# Patient Record
Sex: FEMALE | Race: WHITE | Employment: OTHER | URBAN - NONMETROPOLITAN AREA
[De-identification: names, ages, dates, MRNs, and addresses within clinical notes are randomized per-mention and may not be internally consistent; named-entity substitution may affect disease eponyms.]

---

## 2017-04-26 RX ORDER — MAGNESIUM OXIDE/MAG AA CHELATE 300 MG
300 CAPSULE ORAL DAILY
COMMUNITY

## 2017-04-26 RX ORDER — ATENOLOL 50 MG/1
50 TABLET ORAL DAILY
COMMUNITY

## 2017-04-26 RX ORDER — LISINOPRIL 10 MG/1
10 TABLET ORAL 2 TIMES DAILY
COMMUNITY

## 2017-04-26 RX ORDER — LANOLIN ALCOHOL/MO/W.PET/CERES
1000 CREAM (GRAM) TOPICAL DAILY
COMMUNITY

## 2017-04-26 RX ORDER — AMLODIPINE BESYLATE 5 MG/1
5 TABLET ORAL DAILY
COMMUNITY

## 2017-05-03 ENCOUNTER — OFFICE VISIT (OUTPATIENT)
Dept: CARDIOLOGY | Age: 57
End: 2017-05-03
Payer: MEDICAID

## 2017-05-03 VITALS
HEART RATE: 62 BPM | WEIGHT: 158 LBS | DIASTOLIC BLOOD PRESSURE: 82 MMHG | HEIGHT: 68 IN | BODY MASS INDEX: 23.95 KG/M2 | SYSTOLIC BLOOD PRESSURE: 106 MMHG

## 2017-05-03 DIAGNOSIS — R06.02 SOB (SHORTNESS OF BREATH): ICD-10-CM

## 2017-05-03 DIAGNOSIS — R07.9 CHEST PAIN, UNSPECIFIED TYPE: Primary | ICD-10-CM

## 2017-05-03 DIAGNOSIS — I10 ESSENTIAL HYPERTENSION: ICD-10-CM

## 2017-05-03 DIAGNOSIS — R07.89 CHEST WALL PAIN: ICD-10-CM

## 2017-05-03 PROCEDURE — 93000 ELECTROCARDIOGRAM COMPLETE: CPT | Performed by: NURSE PRACTITIONER

## 2017-05-03 PROCEDURE — 99203 OFFICE O/P NEW LOW 30 MIN: CPT | Performed by: NURSE PRACTITIONER

## 2017-05-03 RX ORDER — MELOXICAM 15 MG/1
15 TABLET ORAL DAILY PRN
Qty: 30 TABLET | Refills: 0 | Status: SHIPPED | OUTPATIENT
Start: 2017-05-03 | End: 2017-06-07 | Stop reason: ALTCHOICE

## 2017-05-04 PROBLEM — I10 ESSENTIAL HYPERTENSION: Status: ACTIVE | Noted: 2017-05-04

## 2017-05-04 PROBLEM — R07.89 CHEST DISCOMFORT: Status: ACTIVE | Noted: 2017-05-04

## 2017-05-04 PROBLEM — R07.89 CHEST WALL PAIN: Status: ACTIVE | Noted: 2017-05-04

## 2017-05-04 PROBLEM — R07.9 CHEST PAIN: Status: ACTIVE | Noted: 2017-05-04

## 2017-05-04 PROBLEM — R06.02 SOB (SHORTNESS OF BREATH): Status: ACTIVE | Noted: 2017-05-04

## 2017-05-10 ENCOUNTER — HOSPITAL ENCOUNTER (OUTPATIENT)
Dept: NON INVASIVE DIAGNOSTICS | Age: 57
Discharge: HOME | End: 2017-05-10
Payer: MEDICAID

## 2017-05-10 DIAGNOSIS — I10 ESSENTIAL HYPERTENSION: ICD-10-CM

## 2017-05-10 LAB
LV EF: 50 %
LVEF MODALITY: NORMAL

## 2017-05-10 PROCEDURE — 93306 TTE W/DOPPLER COMPLETE: CPT

## 2017-05-10 PROCEDURE — 93350 STRESS TTE ONLY: CPT

## 2017-06-07 ENCOUNTER — OFFICE VISIT (OUTPATIENT)
Dept: CARDIOLOGY | Age: 57
End: 2017-06-07
Payer: MEDICAID

## 2017-06-07 VITALS
HEART RATE: 64 BPM | BODY MASS INDEX: 23.79 KG/M2 | DIASTOLIC BLOOD PRESSURE: 82 MMHG | SYSTOLIC BLOOD PRESSURE: 138 MMHG | WEIGHT: 157 LBS | HEIGHT: 68 IN

## 2017-06-07 DIAGNOSIS — I10 ESSENTIAL HYPERTENSION: ICD-10-CM

## 2017-06-07 DIAGNOSIS — R07.9 CHEST PAIN, UNSPECIFIED TYPE: Primary | ICD-10-CM

## 2017-06-07 PROCEDURE — 99213 OFFICE O/P EST LOW 20 MIN: CPT | Performed by: NURSE PRACTITIONER

## 2017-09-07 ENCOUNTER — HOSPITAL ENCOUNTER (OUTPATIENT)
Dept: GENERAL RADIOLOGY | Age: 57
Discharge: HOME | End: 2017-09-07
Payer: MEDICAID

## 2017-09-07 ENCOUNTER — OFFICE VISIT (OUTPATIENT)
Dept: CARDIOLOGY | Age: 57
End: 2017-09-07
Payer: MEDICAID

## 2017-09-07 VITALS
DIASTOLIC BLOOD PRESSURE: 80 MMHG | WEIGHT: 156 LBS | BODY MASS INDEX: 23.64 KG/M2 | HEIGHT: 68 IN | HEART RATE: 60 BPM | SYSTOLIC BLOOD PRESSURE: 120 MMHG

## 2017-09-07 DIAGNOSIS — I10 ESSENTIAL HYPERTENSION: ICD-10-CM

## 2017-09-07 DIAGNOSIS — R07.89 OTHER CHEST PAIN: ICD-10-CM

## 2017-09-07 DIAGNOSIS — F17.200 SMOKER: ICD-10-CM

## 2017-09-07 DIAGNOSIS — Z82.49 FAMILY HISTORY OF CORONARY ARTERY DISEASE: ICD-10-CM

## 2017-09-07 DIAGNOSIS — R06.02 SOB (SHORTNESS OF BREATH): ICD-10-CM

## 2017-09-07 DIAGNOSIS — R07.89 OTHER CHEST PAIN: Primary | ICD-10-CM

## 2017-09-07 LAB
ANION GAP SERPL CALCULATED.3IONS-SCNC: 12 MMOL/L (ref 7–19)
BASOPHILS ABSOLUTE: 0.1 K/UL (ref 0–0.2)
BASOPHILS RELATIVE PERCENT: 0.7 % (ref 0–1)
BUN BLDV-MCNC: 8 MG/DL (ref 6–20)
CALCIUM SERPL-MCNC: 10.2 MG/DL (ref 8.6–10)
CHLORIDE BLD-SCNC: 101 MMOL/L (ref 98–111)
CO2: 31 MMOL/L (ref 22–29)
CREAT SERPL-MCNC: 0.5 MG/DL (ref 0.5–0.9)
EOSINOPHILS ABSOLUTE: 0.1 K/UL (ref 0–0.6)
EOSINOPHILS RELATIVE PERCENT: 1.6 % (ref 0–5)
GFR NON-AFRICAN AMERICAN: >60
GLUCOSE BLD-MCNC: 78 MG/DL (ref 74–109)
HCT VFR BLD CALC: 46.1 % (ref 37–47)
HEMOGLOBIN: 15.4 G/DL (ref 12–16)
LYMPHOCYTES ABSOLUTE: 2.6 K/UL (ref 1.1–4.5)
LYMPHOCYTES RELATIVE PERCENT: 38.8 % (ref 20–40)
MCH RBC QN AUTO: 30.4 PG (ref 27–31)
MCHC RBC AUTO-ENTMCNC: 33.4 G/DL (ref 33–37)
MCV RBC AUTO: 91.1 FL (ref 81–99)
MONOCYTES ABSOLUTE: 0.5 K/UL (ref 0–0.9)
MONOCYTES RELATIVE PERCENT: 6.8 % (ref 0–10)
NEUTROPHILS ABSOLUTE: 3.5 K/UL (ref 1.5–7.5)
NEUTROPHILS RELATIVE PERCENT: 51.8 % (ref 50–65)
PDW BLD-RTO: 13 % (ref 11.5–14.5)
PLATELET # BLD: 220 K/UL (ref 130–400)
PMV BLD AUTO: 9.5 FL (ref 9.4–12.3)
POTASSIUM SERPL-SCNC: 4.3 MMOL/L (ref 3.5–5)
RBC # BLD: 5.06 M/UL (ref 4.2–5.4)
SODIUM BLD-SCNC: 144 MMOL/L (ref 136–145)
WBC # BLD: 6.8 K/UL (ref 4.8–10.8)

## 2017-09-07 PROCEDURE — 99214 OFFICE O/P EST MOD 30 MIN: CPT | Performed by: NURSE PRACTITIONER

## 2017-09-07 PROCEDURE — 71020 XR CHEST STANDARD TWO VW: CPT

## 2017-09-13 ENCOUNTER — HOSPITAL ENCOUNTER (OUTPATIENT)
Dept: CARDIAC CATH/INVASIVE PROCEDURES | Age: 57
Discharge: HOME | End: 2017-09-13
Attending: INTERNAL MEDICINE | Admitting: INTERNAL MEDICINE
Payer: MEDICAID

## 2017-09-13 VITALS
RESPIRATION RATE: 14 BRPM | HEIGHT: 68 IN | WEIGHT: 156 LBS | SYSTOLIC BLOOD PRESSURE: 153 MMHG | BODY MASS INDEX: 23.64 KG/M2 | OXYGEN SATURATION: 96 % | DIASTOLIC BLOOD PRESSURE: 71 MMHG | TEMPERATURE: 97.9 F | HEART RATE: 60 BPM

## 2017-09-13 LAB
EKG P AXIS: 67 DEGREES
EKG P-R INTERVAL: 192 MS
EKG Q-T INTERVAL: 460 MS
EKG QRS DURATION: 96 MS
EKG QTC CALCULATION (BAZETT): 453 MS
EKG T AXIS: 63 DEGREES

## 2017-09-13 PROCEDURE — 2500000003 HC RX 250 WO HCPCS

## 2017-09-13 PROCEDURE — C1769 GUIDE WIRE: HCPCS

## 2017-09-13 PROCEDURE — 2709999900 HC NON-CHARGEABLE SUPPLY

## 2017-09-13 PROCEDURE — 93458 L HRT ARTERY/VENTRICLE ANGIO: CPT | Performed by: INTERNAL MEDICINE

## 2017-09-13 PROCEDURE — 93005 ELECTROCARDIOGRAM TRACING: CPT

## 2017-09-13 PROCEDURE — 2720000001 HC MISC SURG SUPPLY STERILE $51-500

## 2017-09-13 PROCEDURE — 2580000003 HC RX 258: Performed by: INTERNAL MEDICINE

## 2017-09-13 PROCEDURE — 99024 POSTOP FOLLOW-UP VISIT: CPT | Performed by: INTERNAL MEDICINE

## 2017-09-13 PROCEDURE — C1894 INTRO/SHEATH, NON-LASER: HCPCS

## 2017-09-13 PROCEDURE — 6360000002 HC RX W HCPCS

## 2017-09-13 PROCEDURE — C1887 CATHETER, GUIDING: HCPCS

## 2017-09-13 RX ORDER — SODIUM CHLORIDE 0.9 % (FLUSH) 0.9 %
10 SYRINGE (ML) INJECTION EVERY 12 HOURS SCHEDULED
Status: DISCONTINUED | OUTPATIENT
Start: 2017-09-13 | End: 2017-09-13 | Stop reason: HOSPADM

## 2017-09-13 RX ORDER — SODIUM CHLORIDE 0.9 % (FLUSH) 0.9 %
10 SYRINGE (ML) INJECTION PRN
Status: DISCONTINUED | OUTPATIENT
Start: 2017-09-13 | End: 2017-09-13 | Stop reason: HOSPADM

## 2017-09-13 RX ORDER — SODIUM CHLORIDE 9 MG/ML
INJECTION, SOLUTION INTRAVENOUS CONTINUOUS
Status: ACTIVE | OUTPATIENT
Start: 2017-09-13 | End: 2017-09-13

## 2017-09-13 RX ORDER — SODIUM CHLORIDE 9 MG/ML
INJECTION, SOLUTION INTRAVENOUS CONTINUOUS
Status: DISCONTINUED | OUTPATIENT
Start: 2017-09-13 | End: 2017-09-13 | Stop reason: HOSPADM

## 2017-09-13 RX ADMIN — SODIUM CHLORIDE: 9 INJECTION, SOLUTION INTRAVENOUS at 09:43

## 2018-10-18 RX ORDER — LISINOPRIL 10 MG/1
10 TABLET ORAL DAILY
COMMUNITY

## 2018-10-18 RX ORDER — LANOLIN ALCOHOL/MO/W.PET/CERES
1000 CREAM (GRAM) TOPICAL DAILY
COMMUNITY

## 2018-10-18 RX ORDER — AMLODIPINE BESYLATE 5 MG/1
5 TABLET ORAL DAILY
COMMUNITY

## 2018-10-18 RX ORDER — MAGNESIUM OXIDE/MAG AA CHELATE 300 MG
CAPSULE ORAL
COMMUNITY

## 2018-10-18 RX ORDER — ALBUTEROL SULFATE 90 UG/1
2 AEROSOL, METERED RESPIRATORY (INHALATION) EVERY 4 HOURS PRN
COMMUNITY

## 2018-10-18 RX ORDER — ATENOLOL 50 MG/1
50 TABLET ORAL DAILY
COMMUNITY

## 2018-10-29 ENCOUNTER — OFFICE VISIT (OUTPATIENT)
Dept: PULMONOLOGY | Facility: CLINIC | Age: 58
End: 2018-10-29

## 2018-10-29 VITALS
HEIGHT: 68 IN | BODY MASS INDEX: 23.95 KG/M2 | SYSTOLIC BLOOD PRESSURE: 122 MMHG | RESPIRATION RATE: 16 BRPM | DIASTOLIC BLOOD PRESSURE: 74 MMHG | WEIGHT: 158 LBS | HEART RATE: 64 BPM

## 2018-10-29 DIAGNOSIS — F17.210 CIGARETTE NICOTINE DEPENDENCE WITHOUT COMPLICATION: ICD-10-CM

## 2018-10-29 DIAGNOSIS — J98.4 RESTRICTIVE LUNG DISEASE: Primary | ICD-10-CM

## 2018-10-29 DIAGNOSIS — J98.4 DISEASE OF LUNG: Primary | ICD-10-CM

## 2018-10-29 LAB
DIFF CAP.CO: NORMAL ML/MMHG SEC
FEV1/FVC: NORMAL %
FEV1: NORMAL LITERS
FVC VOL RESPIRATORY: NORMAL LITERS
TLC: NORMAL LITERS

## 2018-10-29 PROCEDURE — 94010 BREATHING CAPACITY TEST: CPT | Performed by: INTERNAL MEDICINE

## 2018-10-29 PROCEDURE — 90471 IMMUNIZATION ADMIN: CPT | Performed by: INTERNAL MEDICINE

## 2018-10-29 PROCEDURE — 94727 GAS DIL/WSHOT DETER LNG VOL: CPT | Performed by: INTERNAL MEDICINE

## 2018-10-29 PROCEDURE — 94729 DIFFUSING CAPACITY: CPT | Performed by: INTERNAL MEDICINE

## 2018-10-29 PROCEDURE — 90674 CCIIV4 VAC NO PRSV 0.5 ML IM: CPT | Performed by: INTERNAL MEDICINE

## 2018-10-29 PROCEDURE — 99213 OFFICE O/P EST LOW 20 MIN: CPT | Performed by: INTERNAL MEDICINE

## 2018-10-29 RX ORDER — MELATONIN
2000 DAILY
COMMUNITY
End: 2020-02-05 | Stop reason: DRUGHIGH

## 2018-10-29 NOTE — PROGRESS NOTES
Subjective   Janneth Baltazar is a 58 y.o. female.   CC:   Chief Complaint   Patient presents with   • Lung Follow-up     Lung scarring        HPI: Lung scarring, restrictive lung disease, and chronic tobacco use.  History of Present Illness   The patient has no acute respiratory tract complaints.  She continued to smoke but has well under a 30-pack-year history of smoking.  I did  her regarding smoking cessation.  She also received a flu shot today.  The following portions of the patient's history were reviewed and updated as appropriate: allergies, current medications, past family history, past medical history, past social history, past surgical history and problem list.   has a past medical history of Breast cancer (CMS/[facility]).  family history includes Cancer in her mother.   reports that she has been smoking.  She has a 10.00 pack-year smoking history. She has never used smokeless tobacco. She reports that she does not drink alcohol or use drugs.  Review of Systems   Constitutional: Negative for activity change, chills and fever.   HENT: Negative for congestion.    Eyes: Negative for visual disturbance.   Respiratory: Positive for shortness of breath.    Cardiovascular: Negative for chest pain.   Gastrointestinal: Negative for nausea and vomiting.   Genitourinary: Negative for difficulty urinating.   Musculoskeletal:        She has some chronic chest wall pain.   Skin:        She complains of some patchy hair loss after receiving a tetanus booster a few weeks ago.   Neurological: Negative for dizziness.   Psychiatric/Behavioral: The patient is not nervous/anxious.        Objective   Complete pulmonary functions are performed and she has evidence of a moderate restrictive ventilatory defect with small airways disease on spirometry.  Lung volumes confirm a moderate restrictive ventilatory defect with a mild decrease in inspiratory capacity.  Diffusion capacity is within normal limits particularly when  corrected for alveolar volume.  Physical Exam  General: She is a pleasant white female who presented no acute distress.  HEENT: She has a few areas of thinning hair in the posterior scalp.  Eyes: Pupils are equal round reactive to light.  Neck: Supple with no JVD.  Chest: Sounds are decreased at the right base.  Cardiac: No murmur or gallop noted.  Abdomen: Soft.  Extremities: There is no cyanosis clubbing or edema.  Dermatologic: No rash noted.  Musculoskeletal: No joint deformities noted.  Neurologic: She has no focal deficits.  Psychiatric: She has appropriate mood and affect.  Lymphatic: No adenopathy palpated  Assessment/Plan   Janneth was seen today for lung follow-up.    Diagnoses and all orders for this visit:    Restrictive lung disease    Cigarette nicotine dependence without complication    Other orders  -     Flucelvax Quad=>4Years ([id number])    She again is counseled regarding smoking cessation and she will return for follow-up appointment in 6 months.    Patient's Body mass index is 24.02 kg/m². BMI is within normal parameters. No follow-up required.

## 2019-06-03 ENCOUNTER — OFFICE VISIT (OUTPATIENT)
Dept: PULMONOLOGY | Facility: CLINIC | Age: 59
End: 2019-06-03

## 2019-06-03 VITALS
DIASTOLIC BLOOD PRESSURE: 80 MMHG | WEIGHT: 152 LBS | HEART RATE: 75 BPM | HEIGHT: 68 IN | SYSTOLIC BLOOD PRESSURE: 120 MMHG | OXYGEN SATURATION: 98 % | BODY MASS INDEX: 23.04 KG/M2

## 2019-06-03 DIAGNOSIS — R05.9 COUGH: Primary | ICD-10-CM

## 2019-06-03 DIAGNOSIS — J98.4 RESTRICTIVE LUNG DISEASE: ICD-10-CM

## 2019-06-03 DIAGNOSIS — F17.210 CIGARETTE NICOTINE DEPENDENCE WITHOUT COMPLICATION: ICD-10-CM

## 2019-06-03 DIAGNOSIS — R06.02 SHORTNESS OF BREATH: ICD-10-CM

## 2019-06-03 DIAGNOSIS — C50.919 MALIGNANT NEOPLASM OF FEMALE BREAST, UNSPECIFIED ESTROGEN RECEPTOR STATUS, UNSPECIFIED LATERALITY, UNSPECIFIED SITE OF BREAST (HCC): ICD-10-CM

## 2019-06-03 PROCEDURE — 99214 OFFICE O/P EST MOD 30 MIN: CPT | Performed by: INTERNAL MEDICINE

## 2019-06-03 RX ORDER — FLUTICASONE PROPIONATE 50 MCG
2 SPRAY, SUSPENSION (ML) NASAL DAILY
COMMUNITY

## 2019-06-03 NOTE — PROGRESS NOTES
Subjective   Janneth Baltazar is a 58 y.o. female.     Chief Complaint   Patient presents with   • f/u lung disease      No My Sticky Note on file.    History of Present Illness   The patient returns today for follow-up.  She does have problems at times when she bends over with dyspnea and then some discomfort in the chest wall.  I think this relates to the fact that when she is bent over she cannot utilize her diaphragm to taking a deep breath and she has had chest wall surgery with some reconstructive procedure because of her breast cancer and I think some of these issues related to muscle pain and difficulty with the use of the accessory muscles of respiration in the right chest wall.  I did tell her that the main thing she can do to help this from getting worse or causing more difficulties is to protect her lung function by quitting smoking.  When I asked her in more detail about her about her smoking history she actually states she has smoked at least a pack a day for 30 years so she is a candidate for lung cancer screening we will arrange for same.    Medical/Family/Social History   has a past medical history of Breast cancer (CMS/[facility]).   has a past surgical history that includes Breast surgery.  family history includes Cancer in her mother.   reports that she has been smoking.  She has a 30.00 pack-year smoking history. She has never used smokeless tobacco. She reports that she does not drink alcohol or use drugs.  Allergies   Allergen Reactions   • Adhesive Tape Unknown (See Comments)   • Levaquin [Levofloxacin] Unknown (See Comments)     Unknown       Medications    Current Outpatient Medications:   •  albuterol (PROVENTIL HFA;VENTOLIN HFA) 108 (90 Base) MCG/ACT inhaler, Inhale 2 puffs Every 4 (Four) Hours As Needed for Wheezing., Disp: , Rfl:   •  amLODIPine (NORVASC) 5 MG tablet, Take 5 mg by mouth Daily., Disp: , Rfl:   •  atenolol (TENORMIN) 50 MG tablet, Take 50 mg by mouth Daily., Disp: , Rfl:   •   "Calcium Carb-Cholecalciferol (CALCIUM 600+D3) 600-200 MG-UNIT tablet, Take  by mouth., Disp: , Rfl:   •  cholecalciferol (VITAMIN D3) 1000 units tablet, Take 2,000 Units by mouth Daily., Disp: , Rfl:   •  fluticasone (FLONASE) 50 MCG/ACT nasal spray, 2 sprays into the nostril(s) as directed by provider Daily., Disp: , Rfl:   •  lisinopril (PRINIVIL,ZESTRIL) 10 MG tablet, Take 10 mg by mouth Daily., Disp: , Rfl:   •  Magnesium 300 MG capsule, Take  by mouth., Disp: , Rfl:   •  vitamin B-12 (CYANOCOBALAMIN) 1000 MCG tablet, Take 1,000 mcg by mouth Daily., Disp: , Rfl:     Review of Systems   Constitutional: Negative for chills and fever.   HENT: Negative for congestion.    Eyes: Negative for visual disturbance.   Respiratory: Positive for cough and shortness of breath.    Cardiovascular: Negative for leg swelling.   Gastrointestinal: Negative for diarrhea, nausea and vomiting.   Genitourinary: Negative for difficulty urinating.   Musculoskeletal: Negative for arthralgias.        She has had chest wall pain on the right particularly if she bends over or lays on her right side.   Skin: Negative for rash.   Neurological: Negative for dizziness and speech difficulty.   Hematological: Negative for adenopathy.   Psychiatric/Behavioral: The patient is not nervous/anxious.      ------------------------------------  Objective   /80   Pulse 75   Ht 172.7 cm (68\")   Wt 68.9 kg (152 lb)   SpO2 98% Comment: ra  Breastfeeding? No   BMI 23.11 kg/m²   Physical Exam   Constitutional: She is oriented to person, place, and time. She appears well-developed and well-nourished.   HENT:   Head: Normocephalic and atraumatic.   Eyes: EOM are normal. Pupils are equal, round, and reactive to light.   Neck: Normal range of motion. Neck supple.   Cardiovascular: Normal rate, regular rhythm and normal heart sounds.   Pulmonary/Chest: Effort normal.   Breath sounds are somewhat diminished at the right base.   Abdominal: Soft. "   Musculoskeletal: Normal range of motion.   Neurological: She is alert and oriented to person, place, and time.   Skin: Skin is warm and dry.   Psychiatric: She has a normal mood and affect.   Nursing note and vitals reviewed.          Pulmonary Functions Testing Results:  FEV1   Date Value Ref Range Status   10/29/2018 53% liters Final     FVC   Date Value Ref Range Status   10/29/2018 58% liters Final     FEV1/FVC   Date Value Ref Range Status   10/29/2018 72.86% % Final     TLC   Date Value Ref Range Status   10/29/2018 78% liters Final     DLCO   Date Value Ref Range Status   10/29/2018 84% ml/mmHg sec Final        Assessment/Plan   Janneth was seen today for f/u lung disease.    Diagnoses and all orders for this visit:    Cough    Shortness of breath    Restrictive lung disease    Cigarette nicotine dependence without complication  -     CT Chest Low Dose Wo; Future    Malignant neoplasm of female breast, unspecified estrogen receptor status, unspecified laterality, unspecified site of breast (CMS/HCC)      Patient's Body mass index is 23.11 kg/m². BMI is within normal parameters. No follow-up required..      I do think this patient's history of breast cancer and reconstructive surgery for same is a significant contributing factor to her dyspnea and that is because at amounts of the right chest.  I am going to get the screening chest CT we will try to do this at [facility] and call her with results or if that is not feasible we will try doing at [facility].  Assuming no significant normalities are present we will see her back in 6 months with complete pulmonary functions on return.  We can certainly continue to do yearly screening chest CTs as long she meets screening guidelines.

## 2019-06-03 NOTE — PATIENT INSTRUCTIONS
I did advise the patient that she would be a candidate for lung cancer screening as on questioning her regarding her smoking history she states she has smoked a pack a day for at least 30 years.  We will try to schedule this at [facility] and call with results.  I will see her back in 6 months otherwise with complete pulmonary function studies.

## 2019-06-19 DIAGNOSIS — F17.210 CIGARETTE NICOTINE DEPENDENCE WITHOUT COMPLICATION: ICD-10-CM

## 2019-12-03 ENCOUNTER — OFFICE VISIT (OUTPATIENT)
Dept: PULMONOLOGY | Facility: CLINIC | Age: 59
End: 2019-12-03

## 2019-12-03 VITALS
HEART RATE: 62 BPM | DIASTOLIC BLOOD PRESSURE: 68 MMHG | BODY MASS INDEX: 24.59 KG/M2 | WEIGHT: 153 LBS | OXYGEN SATURATION: 97 % | SYSTOLIC BLOOD PRESSURE: 120 MMHG | HEIGHT: 66 IN

## 2019-12-03 DIAGNOSIS — R05.9 COUGH: Primary | ICD-10-CM

## 2019-12-03 DIAGNOSIS — R05.9 COUGH: ICD-10-CM

## 2019-12-03 DIAGNOSIS — F17.210 CIGARETTE NICOTINE DEPENDENCE WITHOUT COMPLICATION: ICD-10-CM

## 2019-12-03 DIAGNOSIS — J44.9 COPD, MODERATE (HCC): ICD-10-CM

## 2019-12-03 DIAGNOSIS — J98.4 RESTRICTIVE LUNG DISEASE: Primary | ICD-10-CM

## 2019-12-03 PROCEDURE — 99214 OFFICE O/P EST MOD 30 MIN: CPT | Performed by: INTERNAL MEDICINE

## 2019-12-03 PROCEDURE — 94664 DEMO&/EVAL PT USE INHALER: CPT | Performed by: INTERNAL MEDICINE

## 2019-12-03 PROCEDURE — 94727 GAS DIL/WSHOT DETER LNG VOL: CPT | Performed by: INTERNAL MEDICINE

## 2019-12-03 PROCEDURE — 94010 BREATHING CAPACITY TEST: CPT | Performed by: INTERNAL MEDICINE

## 2019-12-03 PROCEDURE — 94729 DIFFUSING CAPACITY: CPT | Performed by: INTERNAL MEDICINE

## 2019-12-03 RX ORDER — DOCUSATE SODIUM 100 MG/1
100 CAPSULE, LIQUID FILLED ORAL 2 TIMES DAILY
COMMUNITY

## 2019-12-03 NOTE — PROGRESS NOTES
Subjective   Janneth Baltazar is a 59 y.o. female.     Chief Complaint   Patient presents with   • Cough      No My Sticky Note on file.    History of Present Illness   The patient had problems a few months ago with severe bronchitis and flulike symptoms and was on antibiotics periodically for a period of about 2 months.  She also took some of a young relatives neb treatments which helped her.  She already is on albuterol to her that was the same medication most likely.  Her pulmonary function study today show improvement in both her FVC and FEV1 but to a greater degree in her FVC so she now has a moderate obstructive pattern.  This is not unexpected based on her smoking history.  She has some restrictive lung disease due to scarring from previous surgical procedures but now does have an obstructive pattern.  I did tell we will add Spiriva to her regimen and I gave her samples of the Respimat device and demonstrated the use of the Respimat device to her and we will also check an alpha-1 antitrypsin study and call with results.  She did have problems recently with severe chest and back tightness and had evaluation at a local emergency room was felt to be having muscle spasms.  She clearly has tenderness to palpation over the upper back between the scapula areas.  She was prescribed a muscle relaxer for this but has not yet started it because she is concerned about side effects and she will follow-up with her primary care physician on this.    Medical/Family/Social History   has a past medical history of Breast cancer (CMS/[facility]).   has a past surgical history that includes Breast surgery.  family history includes Cancer in her mother.   reports that she has been smoking.  She has a 30.00 pack-year smoking history. She has never used smokeless tobacco. She reports that she does not drink alcohol or use drugs.  Allergies   Allergen Reactions   • Adhesive Tape Unknown (See Comments)   • Levaquin [Levofloxacin] Unknown (See  "Comments)     Unknown       Medications    Current Outpatient Medications:   •  albuterol (PROVENTIL HFA;VENTOLIN HFA) 108 (90 Base) MCG/ACT inhaler, Inhale 2 puffs Every 4 (Four) Hours As Needed for Wheezing., Disp: , Rfl:   •  amLODIPine (NORVASC) 5 MG tablet, Take 5 mg by mouth Daily., Disp: , Rfl:   •  atenolol (TENORMIN) 50 MG tablet, Take 50 mg by mouth Daily., Disp: , Rfl:   •  Calcium Carb-Cholecalciferol (CALCIUM 600+D3) 600-200 MG-UNIT tablet, Take  by mouth., Disp: , Rfl:   •  cholecalciferol (VITAMIN D3) 1000 units tablet, Take 2,000 Units by mouth Daily., Disp: , Rfl:   •  docusate sodium (COLACE) 100 MG capsule, Take 100 mg by mouth 2 (Two) Times a Day., Disp: , Rfl:   •  fluticasone (FLONASE) 50 MCG/ACT nasal spray, 2 sprays into the nostril(s) as directed by provider Daily., Disp: , Rfl:   •  lisinopril (PRINIVIL,ZESTRIL) 10 MG tablet, Take 10 mg by mouth Daily., Disp: , Rfl:   •  Magnesium 300 MG capsule, Take  by mouth., Disp: , Rfl:   •  vitamin B-12 (CYANOCOBALAMIN) 1000 MCG tablet, Take 1,000 mcg by mouth Daily., Disp: , Rfl:   •  tiotropium bromide monohydrate (SPIRIVA RESPIMAT) 2.5 MCG/ACT aerosol solution inhaler, Inhale 2 puffs Daily., Disp: 4 inhaler, Rfl: 0    Review of Systems   Constitutional: Negative for chills and fever.   HENT: Negative for congestion.    Eyes: Negative for visual disturbance.   Respiratory: Positive for cough, chest tightness and shortness of breath.    Cardiovascular: Positive for chest pain.   Gastrointestinal: Negative for diarrhea, nausea and vomiting.   Genitourinary: Negative for difficulty urinating.   Musculoskeletal: Negative for arthralgias.   Skin: Negative for rash.   Neurological: Negative for dizziness and speech difficulty.   Psychiatric/Behavioral: The patient is not nervous/anxious.      ------------------------------------  Objective   /68   Pulse 62   Ht 167.6 cm (66\")   Wt 69.4 kg (153 lb)   SpO2 97% Comment: RA  Breastfeeding? No   " BMI 24.69 kg/m²   Physical Exam   Constitutional: She is oriented to person, place, and time. She appears well-developed and well-nourished.   HENT:   Head: Normocephalic and atraumatic.   Eyes: EOM are normal. Pupils are equal, round, and reactive to light.   Neck: Normal range of motion. Neck supple.   Cardiovascular: Normal rate, regular rhythm and normal heart sounds.   Pulmonary/Chest: Effort normal.   Breath sounds are diminished at the right base.   Abdominal: Soft.   Musculoskeletal: Normal range of motion. She exhibits tenderness.   She has tenderness over the upper back between the scapula areas.   Neurological: She is alert and oriented to person, place, and time.   Skin: Skin is warm and dry.   Psychiatric: She has a normal mood and affect.   Nursing note and vitals reviewed.            Pulmonary Functions Testing Results:  PFT Values        Some values may be hidden. Unless noted otherwise, only the newest values recorded on each date are displayed.         Old Values PFT Results 10/29/18 12/3/19   FVC 58%    FEV1 53%    FEV1/FVC 72.86%    DLCO 84%    TLC 78%       Pre Drug PFT Results 10/29/18 12/3/19   FVC  71   FEV1  60   FEF 25-75%  33   FEV1/FVC  67.41      Post Drug PFT Results 10/29/18 12/3/19   No data to display.      Other Tests PFT Results 10/29/18 12/3/19   TLC  82   RV  109   DLCO  81   D/VAsb  101               My interpretation of PFT:  1.  Spirometry is consistent with a moderate obstructive ventilatory defect.  2.  Lung volumes really decrease in vital capacity.  There is also a decrease in expiratory reserve volume and a low normal inspiratory capacity.  3.  Diffusion capacity is within normal limits particularly when corrected for alveolar volume.  4.  The patient spirometry shows improvement in both her FVC and FEV1 but to a greater degree in her FVC which now results in an FEV1 to FVC ratio of under 70% consistent with airflow obstruction.  Her total lung capacity is dropped  slightly but not significantly.  Diffusion capacity is also dropped slightly but not significantly.  Assessment/Plan   Janneth was seen today for cough.    Diagnoses and all orders for this visit:    Restrictive lung disease    COPD, moderate (CMS/HCC)  -     tiotropium bromide monohydrate (SPIRIVA RESPIMAT) 2.5 MCG/ACT aerosol solution inhaler; Inhale 2 puffs Daily.    Cough  -     Pulmonary Function Test    Cigarette nicotine dependence without complication      Patient's Body mass index is 24.69 kg/m². BMI is within normal parameters. No follow-up required..      Again, at her previous baseline she had evidence of a mild restrictive ventilatory defect and her total lung capacity is now low normal although actually has dropped numerically from her last visit.  I still think she has some degree of restriction related to her previous surgical procedures but she now has some airflow obstruction as well not unexpected with her smoking history.  This will be treated with Spiriva in addition to her albuterol and again she is given samples of the Spiriva Respimat device and demonstrated the use of the Respimat device to her and we will check an alpha-1 antitrypsin study and call with results.  Otherwise she will return in about 2 months to see how she is doing with the Spiriva.  She is again counseled regarding smoking cessation.

## 2019-12-03 NOTE — PROCEDURES
Pulmonary Function Test  Performed by: Philip Hernandez MD  Authorized by: Philip Hernandez MD      Pre Drug    FVC: 71%   FEV1: 60%   FEF 25-75%: 33%   FEV1/FVC: 67.41%   T%   RV: 109%   DLCO: 81%   D/VAsb: 101%

## 2019-12-03 NOTE — PATIENT INSTRUCTIONS
The patient had an episode of severe bronchitis and flulike symptoms a few months ago and did use a young relatives nebulizer with some good results.  She is already on albuterol inhaler but is not on maintenance therapy and I am going to add the Spiriva Respimat to her regimen to 2.5 mcg size 2 puffs daily.  I did demonstrate the use of the Respimat device to her.  Her PFTs show improvement overall but the improvement is more in the FVC so now she does have an obstructive pattern and she is encouraged on smoking cessation.  She would be a candidate for a follow-up screening CT in several months but I will see her back about 2 months from now just see how she doing with the Spiriva and then we can schedule her screening CT at that time.  She also had some more recent problems with severe pain between her shoulder blades and this can be reproduced with palpation so I do suspect this is chest wall pain.

## 2019-12-10 DIAGNOSIS — J44.9 COPD, MODERATE (HCC): ICD-10-CM

## 2020-01-01 ENCOUNTER — TELEPHONE (OUTPATIENT)
Dept: PULMONOLOGY | Facility: CLINIC | Age: 60
End: 2020-01-01

## 2020-01-01 ENCOUNTER — OFFICE VISIT (OUTPATIENT)
Dept: PULMONOLOGY | Facility: CLINIC | Age: 60
End: 2020-01-01

## 2020-01-01 VITALS
HEART RATE: 64 BPM | DIASTOLIC BLOOD PRESSURE: 82 MMHG | SYSTOLIC BLOOD PRESSURE: 126 MMHG | WEIGHT: 161 LBS | BODY MASS INDEX: 25.88 KG/M2 | OXYGEN SATURATION: 96 % | HEIGHT: 66 IN | TEMPERATURE: 98.1 F

## 2020-01-01 DIAGNOSIS — Z87.891 PERSONAL HISTORY OF NICOTINE DEPENDENCE: ICD-10-CM

## 2020-01-01 DIAGNOSIS — J98.4 RESTRICTIVE LUNG DISEASE: ICD-10-CM

## 2020-01-01 DIAGNOSIS — J98.6 DIAPHRAGM DYSFUNCTION: ICD-10-CM

## 2020-01-01 DIAGNOSIS — F17.210 CIGARETTE NICOTINE DEPENDENCE WITHOUT COMPLICATION: ICD-10-CM

## 2020-01-01 DIAGNOSIS — J44.9 COPD, MODERATE (HCC): Primary | ICD-10-CM

## 2020-01-01 PROCEDURE — 99214 OFFICE O/P EST MOD 30 MIN: CPT | Performed by: NURSE PRACTITIONER

## 2020-01-01 RX ORDER — CITALOPRAM 10 MG/1
10 TABLET ORAL DAILY
COMMUNITY

## 2020-02-05 ENCOUNTER — OFFICE VISIT (OUTPATIENT)
Dept: PULMONOLOGY | Facility: CLINIC | Age: 60
End: 2020-02-05

## 2020-02-05 VITALS
SYSTOLIC BLOOD PRESSURE: 120 MMHG | DIASTOLIC BLOOD PRESSURE: 80 MMHG | OXYGEN SATURATION: 96 % | HEART RATE: 58 BPM | WEIGHT: 153 LBS | BODY MASS INDEX: 24.59 KG/M2 | HEIGHT: 66 IN

## 2020-02-05 DIAGNOSIS — F17.210 CIGARETTE NICOTINE DEPENDENCE WITHOUT COMPLICATION: ICD-10-CM

## 2020-02-05 DIAGNOSIS — R06.02 SHORTNESS OF BREATH: ICD-10-CM

## 2020-02-05 DIAGNOSIS — J44.9 COPD, MODERATE (HCC): ICD-10-CM

## 2020-02-05 DIAGNOSIS — J98.4 RESTRICTIVE LUNG DISEASE: Primary | ICD-10-CM

## 2020-02-05 PROCEDURE — 99214 OFFICE O/P EST MOD 30 MIN: CPT | Performed by: INTERNAL MEDICINE

## 2020-02-05 RX ORDER — ACETAMINOPHEN 160 MG
2000 TABLET,DISINTEGRATING ORAL DAILY
COMMUNITY
Start: 2020-01-08

## 2020-02-05 RX ORDER — MAGNESIUM OXIDE 400 MG/1
1 TABLET ORAL DAILY
COMMUNITY
Start: 2020-01-08

## 2020-02-05 NOTE — PROGRESS NOTES
Subjective   Janneth Baltazar is a 59 y.o. female.     Chief Complaint   Patient presents with   • restrictive lung disease      No My Sticky Note on file.    History of Present Illness   The patient returns today stating she is doing better since using the Spiriva.  She is going to work on smoking cessation.  She would be a candidate for a follow-up screening chest CT after late June.  She may be getting some other scans through her other physicians I told her I am just can schedule her for a follow-up CT in 6 months which would be just over a year from her last CT and then see her back shortly thereafter.  Also will get pulmonary functions.  I did encourage her as the importance of continue work on smoking cessation.  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  If she were able to quit smoking and had deterioration of her pulmonary function we could then do alpha-1 levels to see if she would be a potential candidate for replacement therapy although I told her that was very uncommon to be required in patients who had at least one normal alpha-1 allele.    Medical/Family/Social History   has a past medical history of Breast cancer (CMS/[facility]).   has a past surgical history that includes Breast surgery.  family history includes Cancer in her mother.   reports that she has been smoking. She has a 30.00 pack-year smoking history. She has never used smokeless tobacco. She reports that she does not drink alcohol or use drugs.  Allergies   Allergen Reactions   • Adhesive Tape Unknown (See Comments)   • Levaquin [Levofloxacin] Unknown (See Comments)     Unknown       Medications    Current Outpatient Medications:   •  albuterol (PROVENTIL HFA;VENTOLIN HFA) 108 (90 Base) MCG/ACT inhaler, Inhale 2 puffs Every 4 (Four) Hours As Needed for Wheezing., Disp: , Rfl:   •  amLODIPine (NORVASC) 5 MG tablet, Take 5 mg by mouth Daily., Disp: , Rfl:   •  atenolol  "(TENORMIN) 50 MG tablet, Take 50 mg by mouth Daily., Disp: , Rfl:   •  Cholecalciferol (VITAMIN D3) 50 MCG (2000 UT) capsule, Take 2,000 Units by mouth Daily., Disp: , Rfl:   •  docusate sodium (COLACE) 100 MG capsule, Take 100 mg by mouth 2 (Two) Times a Day., Disp: , Rfl:   •  fluticasone (FLONASE) 50 MCG/ACT nasal spray, 2 sprays into the nostril(s) as directed by provider Daily., Disp: , Rfl:   •  lisinopril (PRINIVIL,ZESTRIL) 10 MG tablet, Take 10 mg by mouth Daily., Disp: , Rfl:   •  Magnesium 300 MG capsule, Take  by mouth., Disp: , Rfl:   •  magnesium oxide (MAG-OX) 400 MG tablet, Take 1 tablet by mouth Daily., Disp: , Rfl:   •  tiotropium bromide monohydrate (SPIRIVA RESPIMAT) 2.5 MCG/ACT aerosol solution inhaler, Inhale 2 puffs Daily., Disp: 4 inhaler, Rfl: 0  •  vitamin B-12 (CYANOCOBALAMIN) 1000 MCG tablet, Take 1,000 mcg by mouth Daily., Disp: , Rfl:   •  tiotropium bromide monohydrate (SPIRIVA RESPIMAT) 2.5 MCG/ACT aerosol solution inhaler, Inhale 2 puffs Daily., Disp: 4 inhaler, Rfl: 0    Review of Systems  Constitutional: Negative for chills and fever.   HENT: Negative for congestion.    Eyes: Negative for visual disturbance.   Respiratory: Positive for cough, chest tightness and shortness of breath.    Cardiovascular: Positive for chest pain.   Gastrointestinal: Negative for diarrhea, nausea and vomiting.   Genitourinary: Negative for difficulty urinating.   Musculoskeletal: Negative for arthralgias.   Skin: Negative for rash.   Neurological: Negative for dizziness and speech difficulty.   Psychiatric/Behavioral: The patient is not nervous/anxious.  Objective   /80   Pulse 58   Ht 167.6 cm (66\")   Wt 69.4 kg (153 lb)   SpO2 96% Comment: RA  Breastfeeding No   BMI 24.69 kg/m²   Physical Exam   Constitutional: She is oriented to person, place, and time. She appears well-developed and well-nourished.   HENT:   Head: Normocephalic and atraumatic.   Eyes: Pupils are equal, round, and reactive " to light. EOM are normal.   Neck: Normal range of motion. Neck supple.   Cardiovascular: Normal rate, regular rhythm and normal heart sounds.   Pulmonary/Chest: Effort normal.   Breath sounds are diminished at the right base in particular.   Abdominal: Soft.   Musculoskeletal: Normal range of motion.   Neurological: She is alert and oriented to person, place, and time.   Skin: Skin is warm and dry.   Psychiatric: She has a normal mood and affect.   Nursing note and vitals reviewed.              Pulmonary Functions Testing Results:  PFT Values        Some values may be hidden. Unless noted otherwise, only the newest values recorded on each date are displayed.         Old Values PFT Results 10/29/18 12/3/19   FVC 58%    FEV1 53%    FEV1/FVC 72.86%    DLCO 84%    TLC 78%       Pre Drug PFT Results 10/29/18 12/3/19   FVC  71   FEV1  60   FEF 25-75%  33   FEV1/FVC  67.41      Post Drug PFT Results 10/29/18 12/3/19   No data to display.      Other Tests PFT Results 10/29/18 12/3/19   TLC  82   RV  109   DLCO  81   D/VAsb  101               Assessment/Plan   Janneth was seen today for restrictive lung disease.    Diagnoses and all orders for this visit:    Restrictive lung disease    COPD, moderate (CMS/HCC)  -     tiotropium bromide monohydrate (SPIRIVA RESPIMAT) 2.5 MCG/ACT aerosol solution inhaler; Inhale 2 puffs Daily.    Shortness of breath    Cigarette nicotine dependence without complication  -     CT Chest Low Dose Wo; Future      Patient's Body mass index is 24.69 kg/m². BMI is within normal parameters. No follow-up required..      We will plan on seeing the patient back in 6 months with a repeat pulmonary function study on return and a low-dose screening CT just prior to return as well.  She is  regarding smoking cessation and she does state she has cut back significantly.

## 2020-02-25 ENCOUNTER — TELEPHONE (OUTPATIENT)
Dept: PULMONOLOGY | Facility: CLINIC | Age: 60
End: 2020-02-25

## 2020-02-25 NOTE — TELEPHONE ENCOUNTER
The urology group in [place] called because they are needing to do a CT of the Abdomen/ pelvis and the patient told them that her CT of the chest was due and Dr. Hernandez could order both at the same time.    I left a message for Radha, the patient's CT of the chest is not due until August. If she needs the CT abdomen/ pelvis urgently they should go ahead and schedule it.

## 2020-03-18 ENCOUNTER — TELEPHONE (OUTPATIENT)
Dept: PULMONOLOGY | Facility: CLINIC | Age: 60
End: 2020-03-18

## 2020-03-18 DIAGNOSIS — J44.9 COPD, MODERATE (HCC): ICD-10-CM

## 2020-03-19 NOTE — TELEPHONE ENCOUNTER
spiriva not covered and I did the PA and it was denied. Alternatives are atrovent and incruse.  What do you want to switch her to?

## 2020-09-10 PROBLEM — Z87.891 PERSONAL HISTORY OF NICOTINE DEPENDENCE: Status: ACTIVE | Noted: 2020-01-01

## 2020-09-10 PROBLEM — J98.6 DIAPHRAGM DYSFUNCTION: Status: ACTIVE | Noted: 2020-01-01

## 2020-09-10 NOTE — PROGRESS NOTES
BOBBY Quinonez  [facility]   Respiratory Disease Clinic  [address] [place] [address]  Phone: [phone]  Fax: [phone]     Janneth Baltazar is a 59 y.o. female.   : 1960  CC:   Chief Complaint   Patient presents with   • restrictive lung disease      HPI: Janneth Baltazar is a pleasant 59 y.o. female. The patient is here today for follow up of RLD.  The patient has moderate COPD, restrictive lung disease, current everyday smoker, elevated right hemidiaphragm, and MZ phenotype.  She states that she has been having some spells with breathing.  She describes it as when she bends over she has pain at the left epigastric area that is sharp in nature.  She states that it takes a few minutes for it to improve.  She states that this is happened approximately 4 times.  She has not been evaluated by her PCP for this yet.  Low-dose lung cancer screening from August 3, 2020 was stable.  The patient denies fevers, chills, cough with purulent sputum, nausea, vomiting, or constipation.  She uses Incruse and albuterol rescue inhaler as needed.  The patient's PCP is Leilani Howard APRN.    The following portions of the patient's history were reviewed and updated as appropriate: allergies, current medications, past family history, past medical history, past social history, past surgical history and problem list.  Past Medical History:   Diagnosis Date   • Breast cancer (CMS/[facility])      Family History   Problem Relation Age of Onset   • Cancer Mother      Social History     Socioeconomic History   • Marital status: Unknown     Spouse name: Not on file   • Number of children: Not on file   • Years of education: Not on file   • Highest education level: Not on file   Tobacco Use   • Smoking status: Current Every Day Smoker     Packs/day: 1.00     Years: 30.00     Pack years: 30.00   • Smokeless tobacco: Never Used   Substance and Sexual Activity   • Alcohol use: No     Alcohol/week: 1.0  "standard drinks     Types: 1 Glasses of wine per week   • Drug use: No   • Sexual activity: Defer     Review of Systems   Constitutional: Negative for chills and fever.   HENT: Negative for congestion.    Eyes: Negative for blurred vision.   Respiratory: Negative for cough and shortness of breath.    Cardiovascular: Negative for chest pain.   Gastrointestinal: Positive for abdominal pain ( Not present at this time). Negative for diarrhea, nausea and vomiting.   Endocrine: Negative for cold intolerance and heat intolerance.   Genitourinary: Negative for dysuria.   Musculoskeletal: Negative for arthralgias.   Skin: Negative for rash.   Neurological: Negative for dizziness, weakness and light-headedness.   Hematological: Does not bruise/bleed easily.   Psychiatric/Behavioral: Negative for agitation. The patient is not nervous/anxious.      /82   Pulse 64   Temp 98.1 °F (36.7 °C)   Ht 167.6 cm (66\")   Wt 73 kg (161 lb)   SpO2 96% Comment: RA  Breastfeeding No   BMI 25.99 kg/m²   Physical Exam   Constitutional: She is oriented to person, place, and time. She appears well-developed and well-nourished. No distress. Face mask in place.   HENT:   Head: Normocephalic and atraumatic.   Eyes: Pupils are equal, round, and reactive to light. Conjunctivae and EOM are normal. No scleral icterus.   Neck: Normal range of motion. Neck supple.   Cardiovascular: Normal rate, regular rhythm and normal heart sounds. Exam reveals no friction rub.   No murmur heard.  Pulmonary/Chest: Effort normal and breath sounds normal. No respiratory distress. She has no wheezes. She has no rales.   Abdominal: Soft. Bowel sounds are normal. She exhibits no distension. There is no tenderness.   Musculoskeletal: Normal range of motion. She exhibits no edema.   Neurological: She is alert and oriented to person, place, and time.   Skin: Skin is warm and dry.   Psychiatric: She has a normal mood and affect. Her behavior is normal. Judgment and " thought content normal.   Nursing note and vitals reviewed.      Pulmonary Functions Testing Results:  PFT Values        Some values may be hidden. Unless noted otherwise, only the newest values recorded on each date are displayed.         Old Values PFT Results 10/29/18 12/3/19   FVC 58%    FEV1 53%    FEV1/FVC 72.86%    DLCO 84%    TLC 78%       Pre Drug PFT Results 10/29/18 12/3/19   FVC  71   FEV1  60   FEF 25-75%  33   FEV1/FVC  67.41      Post Drug PFT Results 10/29/18 12/3/19   No data to display.      Other Tests PFT Results 10/29/18 12/3/19   TLC  82   RV  109   DLCO  81   D/VAsb  101              My PFT Interpretation: None to review today    Imaging:     Assessment and Plan:   Janneth was seen today for restrictive lung disease.    Diagnoses and all orders for this visit:    COPD, moderate (CMS/[facility])  -     Umeclidinium Bromide (Incruse Ellipta) 62.5 MCG/INH aerosol powder ; Inhale 1 puff Daily for 30 days.  Continue Incruse and albuterol rescue inhaler as needed.  Cigarette nicotine dependence without complication  Current everyday smoker.  Recommend smoking cessation.  Restrictive lung disease  Continue current treatment regimen.  Personal history of nicotine dependence  -     CT Chest Low Dose Wo; Future  LDCT stable.  Repeat CT of the chest in 1 year.  Diaphragm dysfunction  Stable.    Health maintenance:   Influenza vaccine: yes   Pneumovax 23: no   Prevnar 13: no   Patient's Body mass index is 25.99 kg/m². BMI is within normal parameters. No follow-up required.    Follow up: 6 months with Dr. David Bullock, APRN  9/11/2020  14:40    Please note that portions of this note were completed with a voice recognition program.

## 2020-09-11 NOTE — PATIENT INSTRUCTIONS
Smoking Tobacco Information, Adult  Smoking tobacco can be harmful to your health. Tobacco contains a poisonous (toxic), colorless chemical called nicotine. Nicotine is addictive. It changes the brain and can make it hard to stop smoking. Tobacco also has other toxic chemicals that can hurt your body and raise your risk of many cancers.  How can smoking tobacco affect me?  Smoking tobacco puts you at risk for:  · Cancer. Smoking is most commonly associated with lung cancer, but can also lead to cancer in other parts of the body.  · Chronic obstructive pulmonary disease (COPD). This is a long-term lung condition that makes it hard to breathe. It also gets worse over time.  · High blood pressure (hypertension), heart disease, stroke, or heart attack.  · Lung infections, such as pneumonia.  · Cataracts. This is when the lenses in the eyes become clouded.  · Digestive problems. This may include peptic ulcers, heartburn, and gastroesophageal reflux disease (GERD).  · Oral health problems, such as gum disease and tooth loss.  · Loss of taste and smell.  Smoking can affect your appearance by causing:  · Wrinkles.  · Yellow or stained teeth, fingers, and fingernails.  Smoking tobacco can also affect your social life, because:  · It may be challenging to find places to smoke when away from home. Many workplaces, restaurants, hotels, and public places are tobacco-free.  · Smoking is expensive. This is due to the cost of tobacco and the long-term costs of treating health problems from smoking.  · Secondhand smoke may affect those around you. Secondhand smoke can cause lung cancer, breathing problems, and heart disease. Children of smokers have a higher risk for:  ? Sudden infant death syndrome (SIDS).  ? Ear infections.  ? Lung infections.  If you currently smoke tobacco, quitting now can help you:  · Lead a longer and healthier life.  · Look, smell, breathe, and feel better over time.  · Save money.  · Protect others from the  harms of secondhand smoke.  What actions can I take to prevent health problems?  Quit smoking    · Do not start smoking. Quit if you already do.  · Make a plan to quit smoking and commit to it. Look for programs to help you and ask your health care provider for recommendations and ideas.  · Set a date and write down all the reasons you want to quit.  · Let your friends and family know you are quitting so they can help and support you. Consider finding friends who also want to quit. It can be easier to quit with someone else, so that you can support each other.  · Talk with your health care provider about using nicotine replacement medicines to help you quit, such as gum, lozenges, patches, sprays, or pills.  · Do not replace cigarette smoking with electronic cigarettes, which are commonly called e-cigarettes. The safety of e-cigarettes is not known, and some may contain harmful chemicals.  · If you try to quit but return to smoking, stay positive. It is common to slip up when you first quit, so take it one day at a time.  · Be prepared for cravings. When you feel the urge to smoke, chew gum or suck on hard candy.  Lifestyle  · Stay busy and take care of your body.  · Drink enough fluid to keep your urine pale yellow.  · Get plenty of exercise and eat a healthy diet. This can help prevent weight gain after quitting.  · Monitor your eating habits. Quitting smoking can cause you to have a larger appetite than when you smoke.  · Find ways to relax. Go out with friends or family to a movie or a restaurant where people do not smoke.  · Ask your health care provider about having regular tests (screenings) to check for cancer. This may include blood tests, imaging tests, and other tests.  · Find ways to manage your stress, such as meditation, yoga, or exercise.  Where to find support  To get support to quit smoking, consider:  · Asking your health care provider for more information and resources.  · Taking classes to learn  more about quitting smoking.  · Looking for local organizations that offer resources about quitting smoking.  · Joining a support group for people who want to quit smoking in your local community.  · Calling the smokefree.gov counselor helpline: 1-800-Quit-Now (1-997.103.7898)  Where to find more information  You may find more information about quitting smoking from:  · HelpGuide.org: www.helpguide.org  · Smokefree.gov: smokefree.gov  · American Lung Association: www.lung.org  Contact a health care provider if you:  · Have problems breathing.  · Notice that your lips, nose, or fingers turn blue.  · Have chest pain.  · Are coughing up blood.  · Feel faint or you pass out.  · Have other health changes that cause you to worry.  Summary  · Smoking tobacco can negatively affect your health, the health of those around you, your finances, and your social life.  · Do not start smoking. Quit if you already do. If you need help quitting, ask your health care provider.  · Think about joining a support group for people who want to quit smoking in your local community. There are many effective programs that will help you to quit this behavior.  This information is not intended to replace advice given to you by your health care provider. Make sure you discuss any questions you have with your health care provider.  Document Released: 01/02/2018 Document Revised: 02/06/2019 Document Reviewed: 01/02/2018  Elsevier Patient Education © 2020 Elsevier Inc[employer]      Chronic Obstructive Pulmonary Disease  Chronic obstructive pulmonary disease (COPD) is a long-term (chronic) lung problem. When you have COPD, it is hard for air to get in and out of your lungs. Usually the condition gets worse over time, and your lungs will never return to normal. There are things you can do to keep yourself as healthy as possible.  · Your doctor may treat your condition with:  ? Medicines.  ? Oxygen.  ? Lung surgery.  · Your doctor may also  recommend:  ? Rehabilitation. This includes steps to make your body work better. It may involve a team of specialists.  ? Quitting smoking, if you smoke.  ? Exercise and changes to your diet.  ? Comfort measures (palliative care).  Follow these instructions at home:  Medicines  · Take over-the-counter and prescription medicines only as told by your doctor.  · Talk to your doctor before taking any cough or allergy medicines. You may need to avoid medicines that cause your lungs to be dry.  Lifestyle  · If you smoke, stop. Smoking makes the problem worse. If you need help quitting, ask your doctor.  · Avoid being around things that make your breathing worse. This may include smoke, chemicals, and fumes.  · Stay active, but remember to rest as well.  · Learn and use tips on how to relax.  · Make sure you get enough sleep. Most adults need at least 7 hours of sleep every night.  · Eat healthy foods. Eat smaller meals more often. Rest before meals.  Controlled breathing  Learn and use tips on how to control your breathing as told by your doctor. Try:  · Breathing in (inhaling) through your nose for 1 second. Then, pucker your lips and breath out (exhale) through your lips for 2 seconds.  · Putting one hand on your belly (abdomen). Breathe in slowly through your nose for 1 second. Your hand on your belly should move out. Pucker your lips and breathe out slowly through your lips. Your hand on your belly should move in as you breathe out.    Controlled coughing  Learn and use controlled coughing to clear mucus from your lungs. Follow these steps:  1. Lean your head a little forward.  2. Breathe in deeply.  3. Try to hold your breath for 3 seconds.  4. Keep your mouth slightly open while coughing 2 times.  5. Spit any mucus out into a tissue.  6. Rest and do the steps again 1 or 2 times as needed.  General instructions  · Make sure you get all the shots (vaccines) that your doctor recommends. Ask your doctor about a flu shot  and a pneumonia shot.  · Use oxygen therapy and pulmonary rehabilitation if told by your doctor. If you need home oxygen therapy, ask your doctor if you should buy a tool to measure your oxygen level (oximeter).  · Make a COPD action plan with your doctor. This helps you to know what to do if you feel worse than usual.  · Manage any other conditions you have as told by your doctor.  · Avoid going outside when it is very hot, cold, or humid.  · Avoid people who have a sickness you can catch (contagious).  · Keep all follow-up visits as told by your doctor. This is important.  Contact a doctor if:  · You cough up more mucus than usual.  · There is a change in the color or thickness of the mucus.  · It is harder to breathe than usual.  · Your breathing is faster than usual.  · You have trouble sleeping.  · You need to use your medicines more often than usual.  · You have trouble doing your normal activities such as getting dressed or walking around the house.  Get help right away if:  · You have shortness of breath while resting.  · You have shortness of breath that stops you from:  ? Being able to talk.  ? Doing normal activities.  · Your chest hurts for longer than 5 minutes.  · Your skin color is more blue than usual.  · Your pulse oximeter shows that you have low oxygen for longer than 5 minutes.  · You have a fever.  · You feel too tired to breathe normally.  Summary  · Chronic obstructive pulmonary disease (COPD) is a long-term lung problem.  · The way your lungs work will never return to normal. Usually the condition gets worse over time. There are things you can do to keep yourself as healthy as possible.  · Take over-the-counter and prescription medicines only as told by your doctor.  · If you smoke, stop. Smoking makes the problem worse.  This information is not intended to replace advice given to you by your health care provider. Make sure you discuss any questions you have with your health care  provider.  Document Released: 06/05/2009 Document Revised: 11/30/2018 Document Reviewed: 01/22/2018  Elsevier Patient Education © 2020 Elsevier Inc.

## 2020-09-11 NOTE — TELEPHONE ENCOUNTER
Incruse is not covered on insurance.   Atrovent, Combivent, Stiolto, and flovent are covered without a PA required.    Which one do you want to switch her to?

## 2021-01-01 ENCOUNTER — OFFICE VISIT (OUTPATIENT)
Dept: PULMONOLOGY | Facility: CLINIC | Age: 61
End: 2021-01-01

## 2021-01-01 ENCOUNTER — TELEPHONE (OUTPATIENT)
Dept: PULMONOLOGY | Facility: CLINIC | Age: 61
End: 2021-01-01

## 2021-01-01 VITALS
OXYGEN SATURATION: 96 % | WEIGHT: 163 LBS | BODY MASS INDEX: 26.2 KG/M2 | HEIGHT: 66 IN | HEART RATE: 68 BPM | TEMPERATURE: 97.5 F | SYSTOLIC BLOOD PRESSURE: 132 MMHG | DIASTOLIC BLOOD PRESSURE: 84 MMHG

## 2021-01-01 DIAGNOSIS — R06.02 SHORTNESS OF BREATH: ICD-10-CM

## 2021-01-01 DIAGNOSIS — F17.210 CIGARETTE NICOTINE DEPENDENCE WITHOUT COMPLICATION: ICD-10-CM

## 2021-01-01 DIAGNOSIS — J98.4 RESTRICTIVE LUNG DISEASE: Primary | ICD-10-CM

## 2021-01-01 DIAGNOSIS — J44.9 COPD, MODERATE (HCC): Primary | ICD-10-CM

## 2021-01-01 DIAGNOSIS — Z23 NEED FOR VACCINATION FOR STREP PNEUMONIAE: Primary | ICD-10-CM

## 2021-01-01 DIAGNOSIS — J44.9 COPD, MODERATE (HCC): ICD-10-CM

## 2021-01-01 PROCEDURE — 99214 OFFICE O/P EST MOD 30 MIN: CPT | Performed by: INTERNAL MEDICINE

## 2021-01-01 RX ORDER — TIOTROPIUM BROMIDE INHALATION SPRAY 3.12 UG/1
2 SPRAY, METERED RESPIRATORY (INHALATION)
Qty: 4 G | Refills: 11 | Status: SHIPPED | OUTPATIENT
Start: 2021-01-01

## 2021-01-01 RX ORDER — ALBUTEROL SULFATE 2.5 MG/3ML
2.5 SOLUTION RESPIRATORY (INHALATION) 4 TIMES DAILY PRN
Qty: 360 ML | Refills: 5 | Status: SHIPPED | OUTPATIENT
Start: 2021-01-01

## 2021-01-21 NOTE — TELEPHONE ENCOUNTER
The patient told the pharmacist you wanted her to have a prevnar or pneumovac vaccine. They can give those without orders on patient's 65 or older.  Please call the order in or fax an order for whichever vaccine, if you were wanting her to have one.  Fax# [phone]

## 2021-01-22 NOTE — TELEPHONE ENCOUNTER
Will fax an order for her to receive the Pneumovax at her local pharmacy.  She can receive the Prevnar next year.

## 2021-02-04 NOTE — TELEPHONE ENCOUNTER
Pharmacy sent a request for refills on Incruse. Patient was last seen on 9/11/20 and has a return appointment on 3/12/21.  Refill request sent to Dr. Hernandez.    RAFFAELE:  Patient was given a Prevnar 13 vaccine instead of the pneumovax 23. She said the pharmacy did not have the pneumovax 23 at the time.  Charted under immunizations.

## 2021-03-12 NOTE — ASSESSMENT & PLAN NOTE
I did  her regarding smoking cessation for 2 minutes.  She has a screening chest CT scheduled for August 11 at [facility].  We will call with results when available.

## 2021-03-12 NOTE — PROGRESS NOTES
Chief Complaint  Shortness of Breath    Subjective    History of Present Illness {CC  Problem List  Visit Diagnosis   Encounters  Notes  Medications  Labs  Result Review Imaging  Media     Janneth Baltazar presents to [facility] for shortness of breath.    History of Present Illness   The patient does complain of shortness of breath with exertion.  In particular if she has to walk to her mailbox and has to hyperventilate she will then become short of breath and have a sensation of not being able to get in a deep breath will have to stop and focus on exhaling.  I suspect she is having issues with air trapping and I told her that to try to make sure she totally exhales when she has such symptoms and she could also use pursed lip breathing techniques.  She unfortunate continues to smoke we will continue screening chest CTs.  Her last CT did not show any suspicious lesions.  This was done at [facility].  She has COPD but also has some component of restriction related to lung scarring.  She is not receive the COVID-19 vaccine at this time and she certainly would have comorbid conditions which should prioritize her receiving the vaccine for her age group.  I did tell her we will plan on seeing her back in 6 months.  She will be getting a follow-up screening CT in the interim at [facility].  This order has already been put in and is she is scheduled for August 11.  Prior to Admission medications    Medication Sig Start Date End Date Taking? Authorizing Provider   albuterol (PROVENTIL HFA;VENTOLIN HFA) 108 (90 Base) MCG/ACT inhaler Inhale 2 puffs Every 4 (Four) Hours As Needed for Wheezing.   Yes Jacob Michel MD   amLODIPine (NORVASC) 5 MG tablet Take 5 mg by mouth Daily.   Yes Jacob Michel MD   atenolol (TENORMIN) 50 MG tablet Take 50 mg by mouth Daily.   Yes Jacob Michel MD   Cholecalciferol  "(VITAMIN D3) 50 MCG (2000 UT) capsule Take 2,000 Units by mouth Daily. 1/8/20  Yes Jacob Michel MD   citalopram (CeleXA) 10 MG tablet Take 10 mg by mouth Daily.   Yes Jacob Michel MD   docusate sodium (COLACE) 100 MG capsule Take 100 mg by mouth 2 (Two) Times a Day.   Yes Jacob Michel MD   fluticasone (FLONASE) 50 MCG/ACT nasal spray 2 sprays into the nostril(s) as directed by provider Daily.   Yes Jacob Michel MD   lisinopril (PRINIVIL,ZESTRIL) 10 MG tablet Take 10 mg by mouth Daily.   Yes Jacob Michel MD   Magnesium 300 MG capsule Take  by mouth.   Yes Jacob Michel MD   magnesium oxide (MAG-OX) 400 MG tablet Take 1 tablet by mouth Daily. 1/8/20  Yes Jacob Michel MD   Umeclidinium Bromide (Incruse Ellipta) 62.5 MCG/INH aerosol powder  Inhale 1 puff Daily. 2/4/21  Yes Philip Hernandez MD   vitamin B-12 (CYANOCOBALAMIN) 1000 MCG tablet Take 1,000 mcg by mouth Daily.   Yes Jacob Michel MD   tiotropium bromide monohydrate (Spiriva Respimat) 2.5 MCG/ACT aerosol solution inhaler Inhale 2 puffs Daily for 30 days. 9/11/20 10/11/20  AraKaren APRN       Social History     Socioeconomic History   • Marital status: Unknown     Spouse name: Not on file   • Number of children: Not on file   • Years of education: Not on file   • Highest education level: Not on file   Tobacco Use   • Smoking status: Current Every Day Smoker     Packs/day: 1.00     Years: 30.00     Pack years: 30.00   • Smokeless tobacco: Never Used   Substance and Sexual Activity   • Alcohol use: No     Alcohol/week: 1.0 standard drinks     Types: 1 Glasses of wine per week   • Drug use: No   • Sexual activity: Defer       Objective   Vital Signs:   /84   Pulse 68   Temp 97.5 °F (36.4 °C)   Ht 167.6 cm (66\")   Wt 73.9 kg (163 lb)   SpO2 96% Comment: RA  BMI 26.31 kg/m²     Physical Exam  Vitals and nursing note reviewed.   Constitutional:       Appearance: Normal " appearance.   HENT:      Head: Normocephalic.      Comments: She is wearing a mask.  Eyes:      Extraocular Movements: Extraocular movements intact.      Pupils: Pupils are equal, round, and reactive to light.   Cardiovascular:      Rate and Rhythm: Normal rate and regular rhythm.   Pulmonary:      Comments: Breath sounds are diminished at the right base.  Abdominal:      General: Abdomen is flat.   Musculoskeletal:         General: Normal range of motion.      Cervical back: Normal range of motion.   Skin:     General: Skin is warm and dry.   Neurological:      General: No focal deficit present.      Mental Status: She is alert and oriented to person, place, and time.   Psychiatric:         Mood and Affect: Mood normal.        Result Review :{ Labs  Result Review  Imaging  Med Tab  Media :    PFT Values        Some values may be hidden. Unless noted otherwise, only the newest values recorded on each date are displayed.         Old Values PFT Results 12/3/19   No data to display.      Pre Drug PFT Results 12/3/19   FVC 71   FEV1 60   FEF 25-75% 33   FEV1/FVC 67.41      Post Drug PFT Results 12/3/19   No data to display.      Other Tests PFT Results 12/3/19   TLC 82      DLCO 81   D/VAsb 101               Results for orders placed in visit on 19    Pulmonary Function Test    Narrative  Pulmonary Function Test  Performed by: Philip Hernandez MD  Authorized by: Philip Hernandez MD    Pre Drug  FVC: 71%  FEV1: 60%  FEF 25-75%: 33%  FEV1/FVC: 67.41%  T%  RV: 109%  DLCO: 81%  D/VAsb: 101%      Results for orders placed in visit on 10/29/18    Pulmonary Function Test                 My interpretation of imaging:    CT Chest Low Dose Wo (2020 00:00)        Assessment and Plan {CC Problem List  Visit Diagnosis  ROS  Review (Popup)  Health Maintenance  Quality  BestPractice  Medications  SmartSets  SnapShot Encounters  Media      Diagnoses and all orders for this  visit:    1. Restrictive lung disease (Primary)  Assessment & Plan:  This is related to lung scarring from previous surgery.      2. Shortness of breath  Assessment & Plan:  This relates to her combined obstructive and restrictive lung disease.  She was encouraged to use pursed lip breathing techniques when she had these issues of shortness of breath with exertion as I think she is developing some air trapping.  Again she states if she does focus on exhaling she does better.  I will see her back in 6 months.      3. COPD, moderate (CMS/HCC)  Assessment & Plan:  She may utilize either Incruse or Spiriva, which either she has available along with as needed albuterol.        4. Cigarette nicotine dependence without complication  Assessment & Plan:  I did  her regarding smoking cessation for 2 minutes.  She has a screening chest CT scheduled for August 11 at [facility].  We will call with results when available.        Patient's Body mass index is 26.31 kg/m². BMI is within normal parameters. No follow-up required..        Philip Hernandez MD  3/12/2021  16:29 CST    Follow Up {Instructions Charge Capture  Follow-up Communications   Return in about 6 months (around 9/12/2021).  I did encourage her to receive the COVID-19 vaccine whenever it is available to her locally and I did provide information on her AVS that she should be prioritized to receive the vaccine based on her chronic respiratory conditions for her age group.  Patient was given instructions and counseling regarding her condition or for health maintenance advice. Please see specific information pulled into the AVS if appropriate.

## 2021-03-12 NOTE — ASSESSMENT & PLAN NOTE
She may utilize either Incruse or Spiriva, which either she has available along with as needed albuterol.

## 2021-03-12 NOTE — PATIENT INSTRUCTIONS
The patient still has some issues with shortness of breath particulalyr with exertion.  She describes symptoms that could relate to air trapping and she states when she tries to focus on exhaling deeply she does better and I told her she could certainly continue to do this and use pursed lip breathing techniques.  Her last screening chest CT showed chronic changes but no acute process.  We will plan on a follow-up in about 6 months which will be just over a year since her last CT and then see her back shortly thereafter.  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  I will see her back in 6 months.

## 2021-03-12 NOTE — ASSESSMENT & PLAN NOTE
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  She was encouraged to use pursed lip breathing techniques when she had these issues of shortness of breath with exertion as I think she is developing some air trapping.  Again she states if she does focus on exhaling she does better.  I will see her back in 6 months.

## 2021-04-08 NOTE — TELEPHONE ENCOUNTER
I had a phone conversation with Leilani Howard, the patient's primary healthcare provider, on the afternoon of Tuesday, April 6.  Apparently the patient had some episodes of bending over and having a sensation of something bulging in her abdomen.  An abdominal CT had revealed a hiatal hernia.  Ms. Howard had contacted me regarding the possible need for any surgical intervention.  I advised her that I would just recommend the patient be referred to one of the general surgeons here in [place] to see if any surgical intervention might be indicated.  She does have chronic elevation of the right hemidiaphragm due to previous surgery for breast cancer with some chest wall reconstruction.  I am not sure if this has any bearing on her current issues but on her previous chest CTs there has not been any mention of a hiatal hernia just the chronic elevation of her right hemidiaphragm.

## 2021-04-22 NOTE — TELEPHONE ENCOUNTER
Patient's daughter was asking for Dr. Hernandez to write a letter for disability. Daughter notified we can send records with a written release. She will  the release 4/22/21 to have the patient fill it out.

## 2021-05-27 NOTE — TELEPHONE ENCOUNTER
Atrovent HFA would be an alternative although it would have to be used 4 times a day.  A better alternative would be Spiriva of the 2.5 mcg dose.  The other option would be Tudorza.  I would see if either Spiriva or Tudorza would be covered before switching her to the Atrovent HFA or trying to PA the Incruse.

## 2021-05-27 NOTE — TELEPHONE ENCOUNTER
Patient received a letter from [employer] stating that as of June 1st Incruse will no longer be covered. Her letter said the alternative is Atrovent HFA or to try for a PA on Incruse.  Please advise.

## 2021-05-28 NOTE — TELEPHONE ENCOUNTER
Patient states that she spoke to her insurance company and Spiriva would be covered. She will call back after the 1st when she needs refills.

## 2021-05-28 NOTE — TELEPHONE ENCOUNTER
Patient will check with her insurance company and call back.     She states she will be having hernia surgery in [place] on 6/15/21.

## 2021-06-10 NOTE — TELEPHONE ENCOUNTER
Pharmacy sent a request for refills on Spiriva respimat. Her insurance is no longer covering the Incruse.  She was last seen on 3/12/21 and has a follow up office visit 9/15/21.

## 2021-07-25 NOTE — PATIENT INSTRUCTIONS
The patient definitely think Spiriva Respimat is helping her and samples were provided.  She is a carrier for the alpha-1 antitrypsin deficiency Z allele.  I told generally this would not necessitate any consideration of replacement therapy but if she were to have worsening of her pulmonary status over time despite medical therapy and despite continuing to work on eventually total smoking cessation, we can consider doing levels and see if she was a replacement candidate.  I do not think that is necessary at this time but we can consider that in the future.  I will see her back in 6 months with follow-up complete pulmonary functions and samples of Spiriva Respimat were provided and she will continue to work on smoking cessation.  I will get a screening CT at [facility] just prior to return as well.  
Normal vision: sees adequately in most situations; can see medication labels, newsprint